# Patient Record
Sex: FEMALE | Race: WHITE | URBAN - METROPOLITAN AREA
[De-identification: names, ages, dates, MRNs, and addresses within clinical notes are randomized per-mention and may not be internally consistent; named-entity substitution may affect disease eponyms.]

---

## 2018-01-23 ENCOUNTER — IMPORTED ENCOUNTER (OUTPATIENT)
Dept: URBAN - METROPOLITAN AREA CLINIC 50 | Facility: CLINIC | Age: 63
End: 2018-01-23

## 2018-02-12 ENCOUNTER — IMPORTED ENCOUNTER (OUTPATIENT)
Dept: URBAN - METROPOLITAN AREA CLINIC 50 | Facility: CLINIC | Age: 63
End: 2018-02-12

## 2018-08-04 NOTE — PROCEDURE NOTE: CLINICAL
PROCEDURE NOTE: Removal of Corneal FB at Slit Lamp OD. Anesthesia: Topical. The patient, the procedure, and the correct site were identified initially. Prior to treatment, the risks/benefits/alternatives were discussed. The patient wished to proceed with procedure. Corneal foreign body was removed using a 19 gauge needle at the slit lamp. Patient tolerated procedure well. There were no complications. Post-op instructions given. Mike Betancur PROCEDURE NOTE: Bandage Contact Lens #1 OD. A therapeutic soft contact lens of the of the appropriate size and base curve was selected and then applied to the cornea. The lens fit well and moved appropriately. Mike Betancur

## 2019-07-15 ENCOUNTER — IMPORTED ENCOUNTER (OUTPATIENT)
Dept: URBAN - METROPOLITAN AREA CLINIC 50 | Facility: CLINIC | Age: 64
End: 2019-07-15

## 2020-10-19 ENCOUNTER — IMPORTED ENCOUNTER (OUTPATIENT)
Dept: URBAN - METROPOLITAN AREA CLINIC 50 | Facility: CLINIC | Age: 65
End: 2020-10-19

## 2021-04-18 ASSESSMENT — VISUAL ACUITY
OS_BAT: 20/25
OS_CC: J2
OS_BAT: 20/25
OD_CC: 20/25+2
OS_OTHER: 20/25.
OD_CC: J1+
OD_CC: 20/25+
OD_SC: 20/25-1
OS_OTHER: 20/25. 20/30.
OD_OTHER: 20/30. 20/30-.
OS_CC: 20/20
OS_CC: J1+
OD_BAT: 20/30
OD_CC: J1+
OS_CC: J1+
OS_BAT: 20/25
OD_BAT: 20/20
OS_CC: 20/20-1
OS_OTHER: 20/25. 20/30.
OD_OTHER: 20/25. 20/30.
OS_SC: 20/25-1
OD_BAT: 20/25
OD_CC: J2
OD_OTHER: 20/20.

## 2021-04-18 ASSESSMENT — TONOMETRY
OS_IOP_MMHG: 15
OS_IOP_MMHG: 12
OD_IOP_MMHG: 13
OD_IOP_MMHG: 15
OD_IOP_MMHG: 14
OS_IOP_MMHG: 14

## 2021-11-08 ENCOUNTER — PREPPED CHART (OUTPATIENT)
Dept: URBAN - METROPOLITAN AREA CLINIC 52 | Facility: CLINIC | Age: 66
End: 2021-11-08

## 2021-11-22 ENCOUNTER — ANNUAL COMPREHENSIVE EXAM (OUTPATIENT)
Dept: URBAN - METROPOLITAN AREA CLINIC 52 | Facility: CLINIC | Age: 66
End: 2021-11-22

## 2021-11-22 DIAGNOSIS — H04.123: ICD-10-CM

## 2021-11-22 DIAGNOSIS — H35.372: ICD-10-CM

## 2021-11-22 DIAGNOSIS — H43.813: ICD-10-CM

## 2021-11-22 DIAGNOSIS — H25.13: ICD-10-CM

## 2021-11-22 PROCEDURE — 92015 DETERMINE REFRACTIVE STATE: CPT

## 2021-11-22 PROCEDURE — 92134 CPTRZ OPH DX IMG PST SGM RTA: CPT

## 2021-11-22 PROCEDURE — 92014 COMPRE OPH EXAM EST PT 1/>: CPT

## 2021-11-22 ASSESSMENT — VISUAL ACUITY
OS_SC: 20/25-3
OS_GLARE: 20/40
OU_SC: J3
OD_GLARE: 20/25
OD_GLARE: 20/30
OS_GLARE: 20/30
OD_SC: 20/25-2

## 2021-11-22 ASSESSMENT — TONOMETRY
OD_IOP_MMHG: 12
OS_IOP_MMHG: 13

## 2022-08-01 ENCOUNTER — ESTABLISHED PATIENT (OUTPATIENT)
Dept: URBAN - METROPOLITAN AREA CLINIC 52 | Facility: CLINIC | Age: 67
End: 2022-08-01

## 2022-08-01 DIAGNOSIS — H00.11: ICD-10-CM

## 2022-08-01 PROCEDURE — 92012 INTRM OPH EXAM EST PATIENT: CPT

## 2022-08-01 RX ORDER — TOBRAMYCIN AND DEXAMETHASONE 1; 3 MG/ML; MG/ML
1 SUSPENSION/ DROPS OPHTHALMIC
Start: 2022-08-01 | End: 2022-08-08

## 2022-08-01 ASSESSMENT — VISUAL ACUITY
OD_SC: 20/25
OS_SC: 20/25-1

## 2022-08-01 ASSESSMENT — TONOMETRY
OD_IOP_MMHG: 16
OS_IOP_MMHG: 18

## 2022-11-07 ENCOUNTER — ESTABLISHED PATIENT (OUTPATIENT)
Dept: URBAN - METROPOLITAN AREA CLINIC 52 | Facility: CLINIC | Age: 67
End: 2022-11-07

## 2022-11-07 DIAGNOSIS — H35.372: ICD-10-CM

## 2022-11-07 DIAGNOSIS — H25.13: ICD-10-CM

## 2022-11-07 DIAGNOSIS — H04.123: ICD-10-CM

## 2022-11-07 DIAGNOSIS — H43.813: ICD-10-CM

## 2022-11-07 DIAGNOSIS — H35.033: ICD-10-CM

## 2022-11-07 PROCEDURE — 92134 CPTRZ OPH DX IMG PST SGM RTA: CPT

## 2022-11-07 PROCEDURE — 92014 COMPRE OPH EXAM EST PT 1/>: CPT

## 2022-11-07 ASSESSMENT — VISUAL ACUITY
OD_SC: 20/20-1
OS_GLARE: 20/30
OU_CC: J1+@14IN
OS_GLARE: 20/25
OS_SC: 20/25+2
OD_GLARE: 20/25
OU_SC: 20/20-1
OD_GLARE: 20/25

## 2022-11-07 ASSESSMENT — TONOMETRY
OS_IOP_MMHG: 16
OD_IOP_MMHG: 16

## 2022-11-07 NOTE — PATIENT DISCUSSION
Patient was recently diagnosed with breast cancer. Patient has not started a plan for treatment at this time. Patient has an upcoming appointment with [facility] ISAIAH BONNER on November 16th.